# Patient Record
Sex: MALE | Race: WHITE | Employment: FULL TIME | URBAN - METROPOLITAN AREA
[De-identification: names, ages, dates, MRNs, and addresses within clinical notes are randomized per-mention and may not be internally consistent; named-entity substitution may affect disease eponyms.]

---

## 2018-07-25 VITALS
HEART RATE: 78 BPM | HEIGHT: 74 IN | WEIGHT: 217 LBS | RESPIRATION RATE: 12 BRPM | BODY MASS INDEX: 27.85 KG/M2 | TEMPERATURE: 98.1 F | SYSTOLIC BLOOD PRESSURE: 140 MMHG | DIASTOLIC BLOOD PRESSURE: 90 MMHG

## 2018-07-25 RX ORDER — OMEPRAZOLE 20 MG/1
20 CAPSULE, DELAYED RELEASE ORAL DAILY
COMMUNITY

## 2018-07-25 RX ORDER — VALACYCLOVIR HYDROCHLORIDE 500 MG/1
500 TABLET, FILM COATED ORAL 2 TIMES DAILY
COMMUNITY
End: 2019-02-04 | Stop reason: SDUPTHER

## 2018-07-25 RX ORDER — TADALAFIL 5 MG/1
5 TABLET ORAL PRN
COMMUNITY
End: 2019-02-04 | Stop reason: SDUPTHER

## 2019-02-04 ENCOUNTER — OFFICE VISIT (OUTPATIENT)
Dept: PRIMARY CARE CLINIC | Age: 59
End: 2019-02-04
Payer: COMMERCIAL

## 2019-02-04 VITALS
BODY MASS INDEX: 28.62 KG/M2 | SYSTOLIC BLOOD PRESSURE: 160 MMHG | WEIGHT: 223 LBS | HEART RATE: 78 BPM | RESPIRATION RATE: 17 BRPM | HEIGHT: 74 IN | DIASTOLIC BLOOD PRESSURE: 110 MMHG

## 2019-02-04 DIAGNOSIS — A60.00 GENITAL HERPES SIMPLEX, UNSPECIFIED SITE: ICD-10-CM

## 2019-02-04 DIAGNOSIS — Z12.11 ENCOUNTER FOR SCREENING COLONOSCOPY: ICD-10-CM

## 2019-02-04 DIAGNOSIS — N52.9 ERECTILE DYSFUNCTION, UNSPECIFIED ERECTILE DYSFUNCTION TYPE: Primary | ICD-10-CM

## 2019-02-04 PROCEDURE — 99213 OFFICE O/P EST LOW 20 MIN: CPT | Performed by: FAMILY MEDICINE

## 2019-02-04 RX ORDER — TADALAFIL 5 MG/1
5 TABLET ORAL PRN
Qty: 30 TABLET | Refills: 5 | Status: CANCELLED | OUTPATIENT
Start: 2019-02-04

## 2019-02-04 RX ORDER — VALACYCLOVIR HYDROCHLORIDE 500 MG/1
500 TABLET, FILM COATED ORAL DAILY
Qty: 30 TABLET | Refills: 5 | Status: SHIPPED | OUTPATIENT
Start: 2019-02-04 | End: 2019-08-07 | Stop reason: SDUPTHER

## 2019-02-04 RX ORDER — TADALAFIL 5 MG/1
5 TABLET ORAL PRN
Qty: 30 TABLET | Refills: 5 | Status: SHIPPED | OUTPATIENT
Start: 2019-02-04 | End: 2019-08-07 | Stop reason: SDUPTHER

## 2019-02-04 ASSESSMENT — ENCOUNTER SYMPTOMS
ABDOMINAL PAIN: 0
CHEST TIGHTNESS: 0
COLOR CHANGE: 0
NAUSEA: 0
EYE ITCHING: 0
SINUS PRESSURE: 0
COUGH: 0
WHEEZING: 0
EYES NEGATIVE: 1
SHORTNESS OF BREATH: 0
APNEA: 0
RESPIRATORY NEGATIVE: 1
TROUBLE SWALLOWING: 0
EYE PAIN: 0
BACK PAIN: 0
ABDOMINAL DISTENTION: 0
EYE DISCHARGE: 0
CONSTIPATION: 0
VOMITING: 0
SORE THROAT: 0
DIARRHEA: 0
PHOTOPHOBIA: 0
GASTROINTESTINAL NEGATIVE: 1

## 2019-06-19 ENCOUNTER — TELEPHONE (OUTPATIENT)
Dept: PRIMARY CARE CLINIC | Age: 59
End: 2019-06-19

## 2019-06-19 NOTE — TELEPHONE ENCOUNTER
Submitted a PA request for patient's Tadalafil 5mg tablet. PA was denied due to medical necessity. [address] to inquire about patient picking up prescription, the pharmacist stated that patient last picked up the prescription on 6/9/19 and used a discount card to counteract the lack of insurance coverage. Patient was prescribed Tadalafil for erectile dysfunction. Lety may consider approval of Tadalafil for a certain condition (benign prostatic hyperplasia) AND when used in certain situations [after a trial and inadequate response or intolerance to TWO preferred drugs for BPH: either alfuzosin (generic Uroxatral), doxazosin (generic Cardura), dutasteride (generic Avodart), finasteride (generic Proscar), Rapaflo, tamsulosin (generic Flomax), and terazosin; OR, for those with a documented hypersensitivity to any ingredient in the preferred drugs which is not also in Cialis; OR, for those who are concurrently using a drug that cannot be substituted with another drug or temporarily discontinued and is contraindicated or not recommended for concomitant use with the preferred drugs and acceptable for use with tadalafil. [facility]y did not see information that show that the patient has one of these conditions.

## 2019-08-07 DIAGNOSIS — A60.00 GENITAL HERPES SIMPLEX, UNSPECIFIED SITE: ICD-10-CM

## 2019-08-07 DIAGNOSIS — N52.9 ERECTILE DYSFUNCTION, UNSPECIFIED ERECTILE DYSFUNCTION TYPE: ICD-10-CM

## 2019-08-07 RX ORDER — TADALAFIL 5 MG/1
5 TABLET ORAL PRN
Qty: 30 TABLET | Refills: 5 | Status: SHIPPED | OUTPATIENT
Start: 2019-08-07 | End: 2020-02-13 | Stop reason: SDUPTHER

## 2019-08-07 RX ORDER — VALACYCLOVIR HYDROCHLORIDE 500 MG/1
500 TABLET, FILM COATED ORAL DAILY
Qty: 30 TABLET | Refills: 5 | Status: SHIPPED | OUTPATIENT
Start: 2019-08-07 | End: 2020-02-13 | Stop reason: SDUPTHER

## 2019-08-07 NOTE — TELEPHONE ENCOUNTER
Pt about to leave for vacation and has no refills of meds  He is due for an appointment but he said he doesn't know when he can come in  Wants to know if the meds will still be sent  Meds pended  LOV 2/4/19

## 2020-02-13 ENCOUNTER — OFFICE VISIT (OUTPATIENT)
Dept: PRIMARY CARE CLINIC | Age: 60
End: 2020-02-13

## 2020-02-13 VITALS
HEART RATE: 72 BPM | SYSTOLIC BLOOD PRESSURE: 152 MMHG | DIASTOLIC BLOOD PRESSURE: 98 MMHG | OXYGEN SATURATION: 98 % | BODY MASS INDEX: 29.1 KG/M2 | TEMPERATURE: 97.6 F | WEIGHT: 223.6 LBS

## 2020-02-13 PROCEDURE — 99213 OFFICE O/P EST LOW 20 MIN: CPT | Performed by: INTERNAL MEDICINE

## 2020-02-13 RX ORDER — VALACYCLOVIR HYDROCHLORIDE 500 MG/1
500 TABLET, FILM COATED ORAL DAILY
Qty: 30 TABLET | Refills: 11 | Status: SHIPPED | OUTPATIENT
Start: 2020-02-13 | End: 2021-03-08

## 2020-02-13 RX ORDER — TADALAFIL 5 MG/1
5 TABLET ORAL PRN
Qty: 30 TABLET | Refills: 11 | Status: SHIPPED | OUTPATIENT
Start: 2020-02-13 | End: 2020-08-03

## 2020-02-13 ASSESSMENT — ENCOUNTER SYMPTOMS
ABDOMINAL PAIN: 0
COUGH: 0
CHEST TIGHTNESS: 0
SHORTNESS OF BREATH: 0
DIARRHEA: 0
NAUSEA: 0
BACK PAIN: 0
ABDOMINAL DISTENTION: 0

## 2020-08-03 RX ORDER — TADALAFIL 5 MG/1
TABLET ORAL
Qty: 30 TABLET | Refills: 0 | Status: SHIPPED | OUTPATIENT
Start: 2020-08-03 | End: 2021-03-08

## 2021-03-06 DIAGNOSIS — N52.9 ERECTILE DYSFUNCTION, UNSPECIFIED ERECTILE DYSFUNCTION TYPE: ICD-10-CM

## 2021-03-06 DIAGNOSIS — A60.00 GENITAL HERPES SIMPLEX, UNSPECIFIED SITE: ICD-10-CM

## 2021-03-08 RX ORDER — TADALAFIL 5 MG/1
TABLET ORAL
Qty: 30 TABLET | Refills: 0 | Status: SHIPPED | OUTPATIENT
Start: 2021-03-08 | End: 2021-03-17 | Stop reason: SDUPTHER

## 2021-03-08 RX ORDER — VALACYCLOVIR HYDROCHLORIDE 500 MG/1
TABLET, FILM COATED ORAL
Qty: 30 TABLET | Refills: 0 | Status: SHIPPED | OUTPATIENT
Start: 2021-03-08 | End: 2021-03-17 | Stop reason: SDUPTHER

## 2021-03-08 NOTE — TELEPHONE ENCOUNTER
Requested Prescriptions     Pending Prescriptions Disp Refills    tadalafil (CIALIS) 5 MG tablet [Pharmacy Med Name: Tadalafil Oral Tablet 5 MG] 30 tablet 0     Sig: TAKE 1 TABLET BY MOUTH ONE TIME A DAY AS NEEDED FOR ERECTILE DYSFUNCTION    valACYclovir (VALTREX) 500 MG tablet [Pharmacy Med Name: valACYclovir HCl Oral Tablet 500 MG] 30 tablet 0     Sig: TAKE 1 TABLET BY MOUTH ONE TIME A DAY      Last OV 2/13/2020

## 2021-03-17 ENCOUNTER — OFFICE VISIT (OUTPATIENT)
Dept: PRIMARY CARE CLINIC | Age: 61
End: 2021-03-17

## 2021-03-17 VITALS
BODY MASS INDEX: 28.67 KG/M2 | TEMPERATURE: 97.2 F | HEART RATE: 74 BPM | WEIGHT: 223.4 LBS | SYSTOLIC BLOOD PRESSURE: 134 MMHG | DIASTOLIC BLOOD PRESSURE: 86 MMHG | HEIGHT: 74 IN

## 2021-03-17 DIAGNOSIS — N52.9 ERECTILE DYSFUNCTION, UNSPECIFIED ERECTILE DYSFUNCTION TYPE: ICD-10-CM

## 2021-03-17 DIAGNOSIS — I10 ESSENTIAL HYPERTENSION: Primary | ICD-10-CM

## 2021-03-17 DIAGNOSIS — Z13.1 DIABETES MELLITUS SCREENING: ICD-10-CM

## 2021-03-17 DIAGNOSIS — Z00.00 WELL ADULT EXAM: ICD-10-CM

## 2021-03-17 DIAGNOSIS — A60.00 GENITAL HERPES SIMPLEX, UNSPECIFIED SITE: ICD-10-CM

## 2021-03-17 PROCEDURE — 99213 OFFICE O/P EST LOW 20 MIN: CPT | Performed by: INTERNAL MEDICINE

## 2021-03-17 RX ORDER — VALACYCLOVIR HYDROCHLORIDE 500 MG/1
TABLET, FILM COATED ORAL
Qty: 90 TABLET | Refills: 1 | Status: SHIPPED | OUTPATIENT
Start: 2021-03-17 | End: 2021-11-09 | Stop reason: SDUPTHER

## 2021-03-17 RX ORDER — TRIAMTERENE AND HYDROCHLOROTHIAZIDE 37.5; 25 MG/1; MG/1
1 TABLET ORAL DAILY
Qty: 30 TABLET | Refills: 3 | Status: SHIPPED | OUTPATIENT
Start: 2021-03-17 | End: 2022-08-23 | Stop reason: ALTCHOICE

## 2021-03-17 RX ORDER — TADALAFIL 5 MG/1
TABLET ORAL
Qty: 30 TABLET | Refills: 5 | Status: SHIPPED | OUTPATIENT
Start: 2021-03-17 | End: 2021-11-03 | Stop reason: SDUPTHER

## 2021-03-17 SDOH — ECONOMIC STABILITY: INCOME INSECURITY: HOW HARD IS IT FOR YOU TO PAY FOR THE VERY BASICS LIKE FOOD, HOUSING, MEDICAL CARE, AND HEATING?: NOT HARD AT ALL

## 2021-03-17 SDOH — ECONOMIC STABILITY: FOOD INSECURITY: WITHIN THE PAST 12 MONTHS, THE FOOD YOU BOUGHT JUST DIDN'T LAST AND YOU DIDN'T HAVE MONEY TO GET MORE.: NEVER TRUE

## 2021-03-17 ASSESSMENT — ENCOUNTER SYMPTOMS
COUGH: 0
ABDOMINAL DISTENTION: 0
BACK PAIN: 0
SHORTNESS OF BREATH: 0
ABDOMINAL PAIN: 0
DIARRHEA: 0
NAUSEA: 0
CHEST TIGHTNESS: 0

## 2021-03-17 ASSESSMENT — PATIENT HEALTH QUESTIONNAIRE - PHQ9
SUM OF ALL RESPONSES TO PHQ9 QUESTIONS 1 & 2: 0
SUM OF ALL RESPONSES TO PHQ QUESTIONS 1-9: 0

## 2021-03-17 NOTE — PROGRESS NOTES
3/17/2021   Emy Bahena  1960    The patients PMH, surgical history, family history, medications, allergies were all reviewed and updated as appropriate today. Current Outpatient Medications on File Prior to Visit   Medication Sig Dispense Refill    tadalafil (CIALIS) 5 MG tablet TAKE 1 TABLET BY MOUTH ONE TIME A DAY AS NEEDED FOR ERECTILE DYSFUNCTION 30 tablet 0    valACYclovir (VALTREX) 500 MG tablet TAKE 1 TABLET BY MOUTH ONE TIME A DAY  30 tablet 0    aspirin 81 MG tablet Take 81 mg by mouth daily      omeprazole (PRILOSEC) 20 MG delayed release capsule Take 20 mg by mouth daily       No current facility-administered medications on file prior to visit. Chief Complaint   Patient presents with    Erectile Dysfunction    Mouth Lesions       HPI: last seen 2/2020   Wants ED meds refilled today and Valtrex refills  Pt not interested in GoodRx, wants to get his Cialis AMIE with his INS    BP IS ALWAYS HIGH WHEN I COME IN HERE    Review of Systems   Constitutional: Negative for appetite change, chills, fatigue and fever. Respiratory: Negative for cough, chest tightness and shortness of breath. Cardiovascular: Negative for chest pain. Gastrointestinal: Negative for abdominal distention, abdominal pain, diarrhea and nausea. Genitourinary: Negative for difficulty urinating and dysuria. Musculoskeletal: Negative for back pain. Neurological: Negative for dizziness and headaches. Psychiatric/Behavioral: Negative for agitation and behavioral problems. The patient is not nervous/anxious. OBJECTIVE:  BP (!) 146/100 (Site: Left Upper Arm, Position: Sitting, Cuff Size: Large Adult)   Pulse 74   Temp 97.2 °F (36.2 °C)   Ht 6' 1.5\" (1.867 m)   Wt 223 lb 6.4 oz (101.3 kg)   BMI 29.07 kg/m²    Physical Exam  Vitals signs and nursing note reviewed. Constitutional:       General: He is not in acute distress. Appearance: He is well-developed.       Comments: BP (!) 142/90   Pulse 74   Temp 97.2 °F (36.2 °C)   Ht 6' 1.5\" (1.867 m)   Wt 223 lb 6.4 oz (101.3 kg)   BMI 29.07 kg/m²    HENT:      Head: Normocephalic and atraumatic. Eyes:      General: No scleral icterus. Right eye: No discharge. Left eye: No discharge. Conjunctiva/sclera: Conjunctivae normal.      Pupils: Pupils are equal, round, and reactive to light. Neck:      Musculoskeletal: Normal range of motion and neck supple. Thyroid: No thyromegaly. Vascular: No JVD. Trachea: No tracheal deviation. Cardiovascular:      Rate and Rhythm: Normal rate and regular rhythm. Heart sounds: Normal heart sounds. No murmur. Pulmonary:      Effort: Pulmonary effort is normal. No respiratory distress. Breath sounds: Normal breath sounds. No wheezing or rales. Abdominal:      General: Bowel sounds are normal. There is no distension. Palpations: Abdomen is soft. Tenderness: There is no abdominal tenderness. There is no guarding or rebound. Musculoskeletal: Normal range of motion. General: No tenderness. Lymphadenopathy:      Cervical: No cervical adenopathy. Skin:     General: Skin is warm and dry. Findings: No erythema or rash. Neurological:      Mental Status: He is alert and oriented to person, place, and time. Cranial Nerves: No cranial nerve deficit. Coordination: Coordination normal.      Deep Tendon Reflexes: Reflexes normal.   Psychiatric:         Behavior: Behavior normal.         Thought Content: Thought content normal.         Data Review:   CBC: No results found for: WBC, HGB, HCT, MCV, PLT  Chemistry: No results found for: NA, K, CL, CO2, PHOS, BUN, CREATININE  Hepatic Function: No results found for: AST, ALT, ALB, BILIDIR, BILITOT, ALKPHOS  No results found for: LIPASE, AMYLASE  Lipids: No results found for: CHOL, HDL, TRIG    ASSESSMENT/PLAN  1.  Genital herpes simplex, unspecified site  Valtrex daily prophylaxis #90 with 1 refills OK'd 2.) Takes Cialis 5mg #30 almost daily #30 with 5 refills for ED    3.) HTN- add Maxzide 37.5/25  F/u in 3 months for BP cuff correlation, pt believes he only has situational HTN    Fasting labs ordered in interim    Susanna Santos    Electronically signed by Susanna Santos MD on 3/17/2021 at 2:29 PM

## 2021-10-28 ENCOUNTER — TELEPHONE (OUTPATIENT)
Dept: PRIMARY CARE CLINIC | Age: 61
End: 2021-10-28

## 2021-10-28 NOTE — TELEPHONE ENCOUNTER
[address] faxed over refill requests for the following medications     tadalafil (CIALIS) 5 MG tablet 30 tablet 5 3/17/2021     Sig: TAKE 1 TABLET BY MOUTH ONE TIME A DAY AS NEEDED FOR ERECTILE DYSFUNCTION    Sent to pharmacy as:  Tadalafil 5 MG Oral Tablet (CIALIS)      valACYclovir (VALTREX) 500 MG tablet 90 tablet 1 3/17/2021     Sig: TAKE 1 TABLET BY MOUTH ONE TIME A DAY    Sent to pharmacy as: valACYclovir HCl 500 MG Oral Tablet (VALTREX)      Last OV: 3/17/21  Next OV: not scheduled     (Tried calling patient to schedule an appointment for med refills, left VM to call the office back)

## 2021-11-03 DIAGNOSIS — N52.9 ERECTILE DYSFUNCTION, UNSPECIFIED ERECTILE DYSFUNCTION TYPE: ICD-10-CM

## 2021-11-03 RX ORDER — TADALAFIL 5 MG/1
TABLET ORAL
Qty: 30 TABLET | Refills: 5 | Status: SHIPPED | OUTPATIENT
Start: 2021-11-03 | End: 2022-05-05 | Stop reason: SDUPTHER

## 2021-11-03 NOTE — TELEPHONE ENCOUNTER
----- Message from Katya Medina sent at 11/2/2021  5:10 PM EDT -----  Subject: Refill Request    QUESTIONS  Name of Medication? tadalafil (CIALIS) 5 MG tablet  Patient-reported dosage and instructions? 1 TABLET BY MOUTH ONE TIME A DAY   AS NEEDED FOR ERECTILE DYSFUNCTION  How many days do you have left? 7  Preferred Pharmacy? [address]  Pharmacy phone number (if available)? [phone]  ---------------------------------------------------------------------------  --------------,  Name of Medication? valACYclovir (VALTREX) 500 MG tablet  Patient-reported dosage and instructions? TAKE 1 TABLET BY MOUTH ONE TIME   A DAY  How many days do you have left? 7  Preferred Pharmacy? [address]  Pharmacy phone number (if available)? [phone]  ---------------------------------------------------------------------------  --------------  Ky Fraction INFO  What is the best way for the office to contact you? OK to leave message on   voicemail  Preferred Call Back Phone Number?  [phone]

## 2021-11-09 ENCOUNTER — TELEPHONE (OUTPATIENT)
Dept: PRIMARY CARE CLINIC | Age: 61
End: 2021-11-09

## 2021-11-09 DIAGNOSIS — A60.00 GENITAL HERPES SIMPLEX, UNSPECIFIED SITE: ICD-10-CM

## 2021-11-09 RX ORDER — VALACYCLOVIR HYDROCHLORIDE 500 MG/1
TABLET, FILM COATED ORAL
Qty: 90 TABLET | Refills: 1 | Status: SHIPPED | OUTPATIENT
Start: 2021-11-09 | End: 2022-05-05

## 2021-11-09 NOTE — TELEPHONE ENCOUNTER
Patients pharmacy: [facility], [address]   [address], [address] Requested a prescription refill for the following medication:   valACYclovir (VALTREX) 500 MG tablet 90 tablet 1 3/17/2021     Sig: TAKE 1 TABLET BY MOUTH ONE TIME A DAY    Sent to pharmacy as: valACYclovir HCl 500 MG Oral Tablet (VALTREX)

## 2022-05-05 DIAGNOSIS — A60.00 GENITAL HERPES SIMPLEX, UNSPECIFIED SITE: ICD-10-CM

## 2022-05-05 DIAGNOSIS — N52.9 ERECTILE DYSFUNCTION, UNSPECIFIED ERECTILE DYSFUNCTION TYPE: ICD-10-CM

## 2022-05-05 RX ORDER — VALACYCLOVIR HYDROCHLORIDE 500 MG/1
TABLET, FILM COATED ORAL
Qty: 30 TABLET | Refills: 0 | Status: SHIPPED | OUTPATIENT
Start: 2022-05-05 | End: 2022-07-08 | Stop reason: SDUPTHER

## 2022-05-06 RX ORDER — TADALAFIL 5 MG/1
TABLET ORAL
Qty: 30 TABLET | Refills: 0 | Status: SHIPPED | OUTPATIENT
Start: 2022-05-06 | End: 2022-07-08 | Stop reason: SDUPTHER

## 2022-07-08 DIAGNOSIS — N52.9 ERECTILE DYSFUNCTION, UNSPECIFIED ERECTILE DYSFUNCTION TYPE: ICD-10-CM

## 2022-07-08 DIAGNOSIS — A60.00 GENITAL HERPES SIMPLEX, UNSPECIFIED SITE: ICD-10-CM

## 2022-07-08 RX ORDER — VALACYCLOVIR HYDROCHLORIDE 500 MG/1
TABLET, FILM COATED ORAL
Qty: 30 TABLET | Refills: 0 | Status: SHIPPED | OUTPATIENT
Start: 2022-07-08 | End: 2022-08-23 | Stop reason: SDUPTHER

## 2022-07-08 RX ORDER — TADALAFIL 5 MG/1
TABLET ORAL
Qty: 15 TABLET | Refills: 0 | Status: SHIPPED | OUTPATIENT
Start: 2022-07-08

## 2022-07-08 NOTE — TELEPHONE ENCOUNTER
----- Message from Rosie Bella sent at 7/8/2022 11:48 AM EDT -----  Subject: Refill Request    QUESTIONS  Name of Medication? valACYclovir (VALTREX) 500 MG tablet  Patient-reported dosage and instructions? 1 daily   How many days do you have left? 0  Preferred Pharmacy? [facility] [address]  Pharmacy phone number (if available)? [phone]  ---------------------------------------------------------------------------  --------------,  Name of Medication? tadalafil (CIALIS) 5 MG tablet  Patient-reported dosage and instructions? 5mg, 1 daily  How many days do you have left? 0  Preferred Pharmacy? [facility] [address]  Pharmacy phone number (if available)? [phone]  ---------------------------------------------------------------------------  --------------  Razia LEPE  What is the best way for the office to contact you? OK to leave message on   voicemail  Preferred Call Back Phone Number? [phone]  ---------------------------------------------------------------------------  --------------  SCRIPT ANSWERS  Relationship to Patient?  Self

## 2022-08-18 ENCOUNTER — TELEPHONE (OUTPATIENT)
Dept: PRIMARY CARE CLINIC | Age: 62
End: 2022-08-18

## 2022-08-18 DIAGNOSIS — A60.00 GENITAL HERPES SIMPLEX, UNSPECIFIED SITE: ICD-10-CM

## 2022-08-18 DIAGNOSIS — N52.9 ERECTILE DYSFUNCTION, UNSPECIFIED ERECTILE DYSFUNCTION TYPE: ICD-10-CM

## 2022-08-18 RX ORDER — VALACYCLOVIR HYDROCHLORIDE 500 MG/1
TABLET, FILM COATED ORAL
Qty: 30 TABLET | Refills: 0 | OUTPATIENT
Start: 2022-08-18

## 2022-08-18 RX ORDER — TADALAFIL 5 MG/1
TABLET ORAL
Qty: 15 TABLET | Refills: 0 | OUTPATIENT
Start: 2022-08-18

## 2022-08-18 NOTE — TELEPHONE ENCOUNTER
Recent Visits  Date Type Provider Dept   03/17/21 Office Visit Pj Kiran MD [facility] [address] recent visits within past 540 days with a meds authorizing provider and meeting all other requirements  Future Appointments  No visits were found meeting these conditions. Showing future appointments within next 150 days with a meds authorizing provider and meeting all other requirements     No refills until the pt completes an appt.  LVM asking for the pt to return call to schedule with Dr Martina Moscoso only Thalidomide Pregnancy And Lactation Text: This medication is Pregnancy Category X and is absolutely contraindicated during pregnancy. It is unknown if it is excreted in breast milk.

## 2022-08-18 NOTE — TELEPHONE ENCOUNTER
----- Message from Brad Santos sent at 8/17/2022  2:31 PM EDT -----  Subject: Refill Request    QUESTIONS  Name of Medication? valACYclovir (VALTREX) 500 MG tablet  Patient-reported dosage and instructions? valACYclovir (VALTREX) 500 MG   tablet 1 a day   How many days do you have left? 0  Preferred Pharmacy? [address]  Pharmacy phone number (if available)? [phone]  ---------------------------------------------------------------------------  --------------,  Name of Medication? tadalafil (CIALIS) 5 MG tablet  Patient-reported dosage and instructions? tadalafil (CIALIS) 5 MG tablet 1   a day   How many days do you have left? 0  Preferred Pharmacy? [address]  Pharmacy phone number (if available)? [phone]  ---------------------------------------------------------------------------  --------------  Haylie [facility]LEY  What is the best way for the office to contact you? OK to leave message on   voicemail  Preferred Call Back Phone Number? [phone]  ---------------------------------------------------------------------------  --------------  SCRIPT ANSWERS  Relationship to Patient?  Self

## 2022-08-23 ENCOUNTER — NURSE TRIAGE (OUTPATIENT)
Dept: OTHER | Facility: CLINIC | Age: 62
End: 2022-08-23

## 2022-08-23 ENCOUNTER — OFFICE VISIT (OUTPATIENT)
Dept: PRIMARY CARE CLINIC | Age: 62
End: 2022-08-23

## 2022-08-23 VITALS
BODY MASS INDEX: 29 KG/M2 | HEIGHT: 74 IN | SYSTOLIC BLOOD PRESSURE: 150 MMHG | HEART RATE: 61 BPM | DIASTOLIC BLOOD PRESSURE: 100 MMHG | TEMPERATURE: 97.2 F | OXYGEN SATURATION: 98 % | WEIGHT: 226 LBS

## 2022-08-23 DIAGNOSIS — A60.00 GENITAL HERPES SIMPLEX, UNSPECIFIED SITE: ICD-10-CM

## 2022-08-23 DIAGNOSIS — I10 PRIMARY HYPERTENSION: ICD-10-CM

## 2022-08-23 DIAGNOSIS — Z12.5 PROSTATE CANCER SCREENING: ICD-10-CM

## 2022-08-23 DIAGNOSIS — Z12.11 COLON CANCER SCREENING: ICD-10-CM

## 2022-08-23 DIAGNOSIS — I10 ESSENTIAL HYPERTENSION: ICD-10-CM

## 2022-08-23 DIAGNOSIS — Z23 NEED FOR TDAP VACCINATION: ICD-10-CM

## 2022-08-23 DIAGNOSIS — Z11.59 SCREENING FOR VIRAL DISEASE: Primary | ICD-10-CM

## 2022-08-23 PROCEDURE — 99213 OFFICE O/P EST LOW 20 MIN: CPT | Performed by: INTERNAL MEDICINE

## 2022-08-23 RX ORDER — VALACYCLOVIR HYDROCHLORIDE 500 MG/1
TABLET, FILM COATED ORAL
Qty: 30 TABLET | Refills: 5 | Status: SHIPPED | OUTPATIENT
Start: 2022-08-23

## 2022-08-23 RX ORDER — TRIAMTERENE AND HYDROCHLOROTHIAZIDE 37.5; 25 MG/1; MG/1
1 TABLET ORAL DAILY
Qty: 90 TABLET | Refills: 1 | Status: CANCELLED | OUTPATIENT
Start: 2022-08-23

## 2022-08-23 RX ORDER — LISINOPRIL AND HYDROCHLOROTHIAZIDE 25; 20 MG/1; MG/1
1 TABLET ORAL DAILY
Qty: 30 TABLET | Refills: 5 | Status: SHIPPED | OUTPATIENT
Start: 2022-08-23

## 2022-08-23 ASSESSMENT — PATIENT HEALTH QUESTIONNAIRE - PHQ9
SUM OF ALL RESPONSES TO PHQ QUESTIONS 1-9: 0
SUM OF ALL RESPONSES TO PHQ QUESTIONS 1-9: 0
SUM OF ALL RESPONSES TO PHQ9 QUESTIONS 1 & 2: 0
SUM OF ALL RESPONSES TO PHQ QUESTIONS 1-9: 0
2. FEELING DOWN, DEPRESSED OR HOPELESS: 0
SUM OF ALL RESPONSES TO PHQ QUESTIONS 1-9: 0
1. LITTLE INTEREST OR PLEASURE IN DOING THINGS: 0

## 2022-08-23 NOTE — PROGRESS NOTES
8/23/2022   [facility]  1960    The patients PMH, surgical history, family history, medications, allergies were all reviewed and updated as appropriate today. Current Outpatient Medications on File Prior to Visit   Medication Sig Dispense Refill    tadalafil (CIALIS) 5 MG tablet TAKE 1 TABLET BY MOUTH ONE TIME A DAY AS NEEDED FOR ERECTILE DYSFUNCTION 15 tablet 0    valACYclovir (VALTREX) 500 MG tablet TAKE 1 TABLET BY MOUTH EVERY DAY 30 tablet 0    triamterene-hydroCHLOROthiazide (MAXZIDE-25) 37.5-25 MG per tablet Take 1 tablet by mouth daily 30 tablet 3    aspirin 81 MG tablet Take 81 mg by mouth daily      omeprazole (PRILOSEC) 20 MG delayed release capsule Take 20 mg by mouth daily       No current facility-administered medications on file prior to visit. Chief Complaint   Patient presents with    Follow-up    Medication Refill         HPI:  Emergency visit since he stopped taking his BP meds 14 months ago then noticed that his BP was elevated today, so triage nurse made him an emergency visit today to get diuretic refilled  Due for labs again as well    Pt willing to get TDAP today while here   Eyes got red and it affected my vision and I just felt ill REAL BAD! THIS AM I TOOK MY BP AT HOME THIS AM    Review of Systems-due for colonoscopy for colon CA screen  COVID vax is again advised  Depression screen due    OBJECTIVE:  Ht 6' 1.5\" (1.867 m)   Wt 226 lb (102.5 kg)   BMI 29.41 kg/m²   BP (!) 180/100 (Site: Left Upper Arm, Position: Sitting, Cuff Size: Medium Adult)   Pulse 61   Temp 97.2 °F (36.2 °C) (Infrared)   Ht 6' 1.5\" (1.867 m)   Wt 226 lb (102.5 kg)   SpO2 98%   BMI 29.41 kg/m²     Physical Exam  Constitutional:       General: He is not in acute distress. Appearance: He is well-developed. He is not diaphoretic. HENT:      Head: Normocephalic and atraumatic.       Right Ear: External ear normal.      Left Ear: External ear normal.   Eyes:      General: No scleral icterus. Conjunctiva/sclera: Conjunctivae normal.      Pupils: Pupils are equal, round, and reactive to light. Neck:      Thyroid: No thyromegaly. Vascular: No JVD. Cardiovascular:      Rate and Rhythm: Normal rate and regular rhythm. Heart sounds: Normal heart sounds. No murmur heard. Pulmonary:      Effort: Pulmonary effort is normal. No respiratory distress. Breath sounds: Normal breath sounds. No wheezing or rales. Abdominal:      General: Bowel sounds are normal. There is no distension. Palpations: Abdomen is soft. There is no mass. Tenderness: There is no abdominal tenderness. There is no guarding or rebound. Genitourinary:     Comments: deferred  Musculoskeletal:      Cervical back: Normal range of motion and neck supple. Lymphadenopathy:      Cervical: No cervical adenopathy. Data Review:   CBC: No results found for: WBC, HGB, HCT, MCV, PLT  Chemistry: No results found for: NA, K, CL, CO2, PHOS, BUN, CREATININE, CA  Hepatic Function: No results found for: AST, ALT, ALB, BILIDIR, BILITOT, ALKPHOS  No results found for: LIPASE, AMYLASE  Lipids: No results found for: CHOL, HDL, TRIG    ASSESSMENT/PLAN  1. Primary hypertension  Restart Prinzide instead of Maxzide due to pt side effects on maxzide  BP check in 2 weeks after meds restarted  f/u in 1 month to check BP on meds  Labs ordered today    2. Screening for viral disease  Viral screen ordered per care gap    3.  Prostate cancer screening  PSA ordered with labs    4.) TDAP deferred today due to elevated BP readings    5.) colon CA screen- GI referral [facility] for colon CA screening but he states his INS wont cover it     Wants Valtrex 500mg QD refilled today as chronic herpes prophylaxis    COVID vax is advised  Consider Shingrix vaccination series of 2 shots over 2-6 months if desired for protection from shingles-check with INS first re: coverage before beginning series    Angelo Moran MD           Electronically signed by Pj Kiran MD on 8/23/2022 at 12:17 PM

## 2022-08-23 NOTE — TELEPHONE ENCOUNTER
Received call from Anjel at [facility]- JAQUELINE with [employer] Complaint. Subjective: Caller states \"My blood pressure was very high this morning. \"     Current Symptoms: High BP today. 07:10 am- 182/113      Onset: 1 day ago; intermittent    Associated Symptoms:  Patient reports high stress at work and with family    Pain Severity: 0/10    Temperature: Denies    What has been tried: N/A    Recommended disposition: See in Office Today    Care advice provided, patient verbalizes understanding; denies any other questions or concerns; instructed to call back for any new or worsening symptoms. Patient has appointment at 12:30 pm.  Recommendation after triage was for patient to be seen today. Patient denies any current symptoms. No headache, blurred vision, chest pain, SOB, or dizziness. Will call back if new symptoms occur before upcoming appointment. Attention Provider: Thank you for allowing me to participate in the care of your patient. The patient was connected to triage in response to information provided to the ECC/PSC. Please do not respond through this encounter as the response is not directed to a shared pool.     Reason for Disposition   Systolic BP >= 030 OR Diastolic >= 326    Protocols used: Blood Pressure - High-ADULT-[place]

## 2022-09-08 ENCOUNTER — NURSE ONLY (OUTPATIENT)
Dept: PRIMARY CARE CLINIC | Age: 62
End: 2022-09-08

## 2022-09-08 VITALS — DIASTOLIC BLOOD PRESSURE: 80 MMHG | SYSTOLIC BLOOD PRESSURE: 140 MMHG

## 2022-09-08 DIAGNOSIS — I10 ESSENTIAL HYPERTENSION: Primary | ICD-10-CM

## 2022-09-08 PROCEDURE — 99999 PR OFFICE/OUTPT VISIT,PROCEDURE ONLY: CPT | Performed by: INTERNAL MEDICINE

## 2022-09-08 NOTE — PROGRESS NOTES
Patient here for blood pressure check. Patient taking lisinopril daily.  Informed patient to continue medication

## 2022-09-22 PROBLEM — Z12.11 COLON CANCER SCREENING: Status: RESOLVED | Noted: 2022-08-23 | Resolved: 2022-09-22

## 2023-02-16 DIAGNOSIS — A60.00 GENITAL HERPES SIMPLEX, UNSPECIFIED SITE: ICD-10-CM

## 2023-02-16 RX ORDER — LISINOPRIL AND HYDROCHLOROTHIAZIDE 25; 20 MG/1; MG/1
1 TABLET ORAL DAILY
Qty: 30 TABLET | Refills: 5 | Status: SHIPPED | OUTPATIENT
Start: 2023-02-16

## 2023-02-16 RX ORDER — VALACYCLOVIR HYDROCHLORIDE 500 MG/1
TABLET, FILM COATED ORAL
Qty: 30 TABLET | Refills: 5 | Status: SHIPPED | OUTPATIENT
Start: 2023-02-16

## 2023-02-16 NOTE — TELEPHONE ENCOUNTER
Recent Visits  Date Type Provider Dept   08/23/22 Office Visit Reji Rincon MD [facility] [address] recent visits within past 540 days with a meds authorizing provider and meeting all other requirements  Future Appointments  No visits were found meeting these conditions.   Showing future appointments within next 150 days with a meds authorizing provider and meeting all other requirements

## 2023-02-16 NOTE — TELEPHONE ENCOUNTER
Recent Visits  Date Type Provider Dept   08/23/22 Office Visit Gilda Unger MD [facility] [address] recent visits within past 540 days with a meds authorizing provider and meeting all other requirements  Future Appointments  No visits were found meeting these conditions.   Showing future appointments within next 150 days with a meds authorizing provider and meeting all other requirements

## 2023-08-25 ENCOUNTER — TELEPHONE (OUTPATIENT)
Dept: PRIMARY CARE CLINIC | Age: 63
End: 2023-08-25

## 2023-08-25 DIAGNOSIS — I10 PRIMARY HYPERTENSION: Primary | ICD-10-CM

## 2023-08-25 DIAGNOSIS — A60.00 GENITAL HERPES SIMPLEX, UNSPECIFIED SITE: ICD-10-CM

## 2023-08-25 NOTE — TELEPHONE ENCOUNTER
Recent Visits  Date Type Provider Dept   08/23/22 Office Visit Syed Verdin MD [facility] recent visits within past 540 days with a meds authorizing provider and meeting all other requirements  Future Appointments  No visits were found meeting these conditions.   Showing future appointments within next 150 days with a meds authorizing provider and meeting all other requirements

## 2023-08-25 NOTE — TELEPHONE ENCOUNTER
Dk Christiansen, call for prescription refill for valACYclovir (VALTREX) 500 MG tablet   lisinopril-hydroCHLOROthiazide (PRINZIDE;ZESTORETIC) 20-25 MG per tablet     [facility] [address] [place], [place] - [address] [facility] -  [phone] - F [phone]   [address] [place] [address] [place]  [address]

## 2023-08-28 RX ORDER — VALACYCLOVIR HYDROCHLORIDE 500 MG/1
TABLET, FILM COATED ORAL
Qty: 30 TABLET | Refills: 0 | Status: SHIPPED | OUTPATIENT
Start: 2023-08-28

## 2023-08-28 RX ORDER — LISINOPRIL AND HYDROCHLOROTHIAZIDE 25; 20 MG/1; MG/1
1 TABLET ORAL DAILY
Qty: 30 TABLET | Refills: 0 | Status: SHIPPED | OUTPATIENT
Start: 2023-08-28

## 2023-09-22 DIAGNOSIS — I10 PRIMARY HYPERTENSION: ICD-10-CM

## 2023-09-22 RX ORDER — LISINOPRIL AND HYDROCHLOROTHIAZIDE 25; 20 MG/1; MG/1
1 TABLET ORAL DAILY
Qty: 30 TABLET | Refills: 0 | OUTPATIENT
Start: 2023-09-22

## 2023-09-22 NOTE — TELEPHONE ENCOUNTER
Recent Visits  Date Type Provider Dept   08/23/22 Office Visit Antoine White MD [facility] recent visits within past 540 days with a meds authorizing provider and meeting all other requirements  Future Appointments  No visits were found meeting these conditions. Showing future appointments within next 150 days with a meds authorizing provider and meeting all other requirements       Pt will call back to set up an appt.

## 2023-10-15 SDOH — ECONOMIC STABILITY: FOOD INSECURITY: WITHIN THE PAST 12 MONTHS, THE FOOD YOU BOUGHT JUST DIDN'T LAST AND YOU DIDN'T HAVE MONEY TO GET MORE.: NEVER TRUE

## 2023-10-15 SDOH — ECONOMIC STABILITY: INCOME INSECURITY: HOW HARD IS IT FOR YOU TO PAY FOR THE VERY BASICS LIKE FOOD, HOUSING, MEDICAL CARE, AND HEATING?: NOT HARD AT ALL

## 2023-10-15 SDOH — ECONOMIC STABILITY: HOUSING INSECURITY
IN THE LAST 12 MONTHS, WAS THERE A TIME WHEN YOU DID NOT HAVE A STEADY PLACE TO SLEEP OR SLEPT IN A SHELTER (INCLUDING NOW)?: NO

## 2023-10-15 SDOH — ECONOMIC STABILITY: FOOD INSECURITY: WITHIN THE PAST 12 MONTHS, YOU WORRIED THAT YOUR FOOD WOULD RUN OUT BEFORE YOU GOT MONEY TO BUY MORE.: NEVER TRUE

## 2023-10-15 SDOH — ECONOMIC STABILITY: TRANSPORTATION INSECURITY
IN THE PAST 12 MONTHS, HAS LACK OF TRANSPORTATION KEPT YOU FROM MEETINGS, WORK, OR FROM GETTING THINGS NEEDED FOR DAILY LIVING?: NO

## 2023-10-15 ASSESSMENT — PATIENT HEALTH QUESTIONNAIRE - PHQ9
1. LITTLE INTEREST OR PLEASURE IN DOING THINGS: NOT AT ALL
SUM OF ALL RESPONSES TO PHQ QUESTIONS 1-9: 0
2. FEELING DOWN, DEPRESSED OR HOPELESS: 0
SUM OF ALL RESPONSES TO PHQ QUESTIONS 1-9: 0
SUM OF ALL RESPONSES TO PHQ9 QUESTIONS 1 & 2: 0
2. FEELING DOWN, DEPRESSED OR HOPELESS: NOT AT ALL
1. LITTLE INTEREST OR PLEASURE IN DOING THINGS: 0
SUM OF ALL RESPONSES TO PHQ9 QUESTIONS 1 & 2: 0
SUM OF ALL RESPONSES TO PHQ QUESTIONS 1-9: 0
SUM OF ALL RESPONSES TO PHQ QUESTIONS 1-9: 0

## 2023-10-16 ENCOUNTER — OFFICE VISIT (OUTPATIENT)
Dept: PRIMARY CARE CLINIC | Age: 63
End: 2023-10-16

## 2023-10-16 VITALS
WEIGHT: 223 LBS | RESPIRATION RATE: 17 BRPM | HEIGHT: 74 IN | HEART RATE: 76 BPM | SYSTOLIC BLOOD PRESSURE: 126 MMHG | BODY MASS INDEX: 28.62 KG/M2 | DIASTOLIC BLOOD PRESSURE: 82 MMHG

## 2023-10-16 DIAGNOSIS — Z11.59 SCREENING FOR VIRAL DISEASE: Primary | ICD-10-CM

## 2023-10-16 DIAGNOSIS — Z23 NEED FOR VACCINATION FOR H FLU TYPE B: ICD-10-CM

## 2023-10-16 DIAGNOSIS — A60.00 GENITAL HERPES SIMPLEX, UNSPECIFIED SITE: ICD-10-CM

## 2023-10-16 DIAGNOSIS — Z12.11 COLON CANCER SCREENING: ICD-10-CM

## 2023-10-16 DIAGNOSIS — N52.9 ERECTILE DYSFUNCTION, UNSPECIFIED ERECTILE DYSFUNCTION TYPE: ICD-10-CM

## 2023-10-16 DIAGNOSIS — M72.0 DUPUYTREN CONTRACTURE: ICD-10-CM

## 2023-10-16 DIAGNOSIS — I10 PRIMARY HYPERTENSION: ICD-10-CM

## 2023-10-16 PROCEDURE — 3074F SYST BP LT 130 MM HG: CPT | Performed by: INTERNAL MEDICINE

## 2023-10-16 PROCEDURE — 3079F DIAST BP 80-89 MM HG: CPT | Performed by: INTERNAL MEDICINE

## 2023-10-16 PROCEDURE — 99213 OFFICE O/P EST LOW 20 MIN: CPT | Performed by: INTERNAL MEDICINE

## 2023-10-16 RX ORDER — VALACYCLOVIR HYDROCHLORIDE 500 MG/1
TABLET, FILM COATED ORAL
Qty: 30 TABLET | Refills: 5 | Status: SHIPPED | OUTPATIENT
Start: 2023-10-16

## 2023-10-16 RX ORDER — LISINOPRIL AND HYDROCHLOROTHIAZIDE 25; 20 MG/1; MG/1
1 TABLET ORAL DAILY
Qty: 30 TABLET | Refills: 5 | Status: SHIPPED | OUTPATIENT
Start: 2023-10-16

## 2023-10-16 RX ORDER — TADALAFIL 5 MG/1
TABLET ORAL
Qty: 30 TABLET | Refills: 5 | Status: SHIPPED | OUTPATIENT
Start: 2023-10-16

## 2023-10-16 NOTE — PATIENT INSTRUCTIONS
Schedule screening colonoscopy with gastroenterology for colon cancer screening    Consider Tdap booster in the future    COVID vaccination is advised to pharmacy

## 2023-11-27 ENCOUNTER — TELEPHONE (OUTPATIENT)
Dept: PRIMARY CARE CLINIC | Age: 63
End: 2023-11-27

## 2023-11-27 DIAGNOSIS — R05.1 ACUTE COUGH: Primary | ICD-10-CM

## 2023-11-27 RX ORDER — AZITHROMYCIN 250 MG/1
250 TABLET, FILM COATED ORAL SEE ADMIN INSTRUCTIONS
Qty: 6 TABLET | Refills: 0 | Status: SHIPPED | OUTPATIENT
Start: 2023-11-27 | End: 2023-12-02

## 2023-11-27 NOTE — TELEPHONE ENCOUNTER
Patient tested negative for COVID on Friday 11/24. Patient has had congestion, headache and cough for about 4 days. He is unable to schedule an apt today but wanted to see what Dr. Hermon Callander recommends. Please advise.

## 2023-11-27 NOTE — TELEPHONE ENCOUNTER
Z-pack sent to [facility] in for f/u visit if symptoms persist after the antibiotic    Danitza Hurtado MD

## 2024-01-23 ENCOUNTER — COMMUNITY OUTREACH (OUTPATIENT)
Dept: PRIMARY CARE CLINIC | Age: 64
End: 2024-01-23

## 2024-01-23 NOTE — PROGRESS NOTES
Patient's HM shows they are overdue for Colonoscopy   CareEverywhere and  files searched  without success.

## 2024-04-17 DIAGNOSIS — A60.00 GENITAL HERPES SIMPLEX, UNSPECIFIED SITE: ICD-10-CM

## 2024-04-17 DIAGNOSIS — I10 PRIMARY HYPERTENSION: ICD-10-CM

## 2024-04-17 DIAGNOSIS — N52.9 ERECTILE DYSFUNCTION, UNSPECIFIED ERECTILE DYSFUNCTION TYPE: ICD-10-CM

## 2024-04-17 RX ORDER — TADALAFIL 5 MG/1
TABLET ORAL
Qty: 30 TABLET | Refills: 5 | Status: SHIPPED | OUTPATIENT
Start: 2024-04-17

## 2024-04-17 RX ORDER — LISINOPRIL AND HYDROCHLOROTHIAZIDE 25; 20 MG/1; MG/1
1 TABLET ORAL DAILY
Qty: 30 TABLET | Refills: 5 | Status: SHIPPED | OUTPATIENT
Start: 2024-04-17

## 2024-04-17 RX ORDER — VALACYCLOVIR HYDROCHLORIDE 500 MG/1
TABLET, FILM COATED ORAL
Qty: 30 TABLET | Refills: 5 | Status: SHIPPED | OUTPATIENT
Start: 2024-04-17

## 2024-10-16 ENCOUNTER — TELEPHONE (OUTPATIENT)
Dept: PRIMARY CARE CLINIC | Age: 64
End: 2024-10-16

## 2024-10-16 DIAGNOSIS — I10 PRIMARY HYPERTENSION: ICD-10-CM

## 2024-10-16 DIAGNOSIS — N52.9 ERECTILE DYSFUNCTION, UNSPECIFIED ERECTILE DYSFUNCTION TYPE: ICD-10-CM

## 2024-10-16 DIAGNOSIS — A60.00 GENITAL HERPES SIMPLEX, UNSPECIFIED SITE: ICD-10-CM

## 2024-10-16 RX ORDER — LISINOPRIL AND HYDROCHLOROTHIAZIDE 20; 25 MG/1; MG/1
1 TABLET ORAL DAILY
Qty: 90 TABLET | Refills: 0 | Status: SHIPPED | OUTPATIENT
Start: 2024-10-16

## 2024-10-16 RX ORDER — VALACYCLOVIR HYDROCHLORIDE 500 MG/1
TABLET, FILM COATED ORAL
Qty: 90 TABLET | Refills: 0 | Status: SHIPPED | OUTPATIENT
Start: 2024-10-16

## 2024-10-16 RX ORDER — TADALAFIL 5 MG/1
TABLET ORAL
Qty: 30 TABLET | Refills: 1 | Status: SHIPPED | OUTPATIENT
Start: 2024-10-16

## 2024-10-16 NOTE — TELEPHONE ENCOUNTER
Pt requesting refill on lisinopril-hydroCHLOROthiazide (PRINZIDE;ZESTORETIC) 20-25 MG per tablet, tadalafil (CIALIS) 5 MG tablet and valACYclovir (VALTREX) 500 MG tablet. Pt out of medication and prefers [facility]. Pt stated due to work schedule he is unable to make it in for any follow up appts. Can be reached at [phone].

## 2024-10-16 NOTE — TELEPHONE ENCOUNTER
Recent Visits  Date Type Provider Dept   10/16/23 Office Visit Enmanuel Terry MD [facility]   Showing recent visits within past 540 days with a meds authorizing provider and meeting all other requirements  Future Appointments  No visits were found meeting these conditions.  Showing future appointments within next 150 days with a meds authorizing provider and meeting all other requirements     10/16/2023

## 2025-01-25 SDOH — ECONOMIC STABILITY: TRANSPORTATION INSECURITY
IN THE PAST 12 MONTHS, HAS THE LACK OF TRANSPORTATION KEPT YOU FROM MEDICAL APPOINTMENTS OR FROM GETTING MEDICATIONS?: NO

## 2025-01-25 SDOH — ECONOMIC STABILITY: INCOME INSECURITY: IN THE LAST 12 MONTHS, WAS THERE A TIME WHEN YOU WERE NOT ABLE TO PAY THE MORTGAGE OR RENT ON TIME?: NO

## 2025-01-25 SDOH — ECONOMIC STABILITY: FOOD INSECURITY: WITHIN THE PAST 12 MONTHS, THE FOOD YOU BOUGHT JUST DIDN'T LAST AND YOU DIDN'T HAVE MONEY TO GET MORE.: NEVER TRUE

## 2025-01-25 SDOH — ECONOMIC STABILITY: FOOD INSECURITY: WITHIN THE PAST 12 MONTHS, YOU WORRIED THAT YOUR FOOD WOULD RUN OUT BEFORE YOU GOT MONEY TO BUY MORE.: NEVER TRUE

## 2025-01-25 ASSESSMENT — PATIENT HEALTH QUESTIONNAIRE - PHQ9
SUM OF ALL RESPONSES TO PHQ QUESTIONS 1-9: 0
SUM OF ALL RESPONSES TO PHQ9 QUESTIONS 1 & 2: 0
SUM OF ALL RESPONSES TO PHQ9 QUESTIONS 1 & 2: 0
SUM OF ALL RESPONSES TO PHQ QUESTIONS 1-9: 0
SUM OF ALL RESPONSES TO PHQ QUESTIONS 1-9: 0
1. LITTLE INTEREST OR PLEASURE IN DOING THINGS: NOT AT ALL
2. FEELING DOWN, DEPRESSED OR HOPELESS: NOT AT ALL
SUM OF ALL RESPONSES TO PHQ QUESTIONS 1-9: 0
2. FEELING DOWN, DEPRESSED OR HOPELESS: NOT AT ALL
1. LITTLE INTEREST OR PLEASURE IN DOING THINGS: NOT AT ALL

## 2025-01-28 ENCOUNTER — OFFICE VISIT (OUTPATIENT)
Dept: PRIMARY CARE CLINIC | Age: 65
End: 2025-01-28

## 2025-01-28 VITALS
HEART RATE: 74 BPM | OXYGEN SATURATION: 97 % | BODY MASS INDEX: 28.62 KG/M2 | DIASTOLIC BLOOD PRESSURE: 76 MMHG | WEIGHT: 223 LBS | SYSTOLIC BLOOD PRESSURE: 118 MMHG | HEIGHT: 74 IN

## 2025-01-28 DIAGNOSIS — Z12.11 COLON CANCER SCREENING: ICD-10-CM

## 2025-01-28 DIAGNOSIS — Z11.59 SCREENING FOR VIRAL DISEASE: Primary | ICD-10-CM

## 2025-01-28 DIAGNOSIS — A60.00 GENITAL HERPES SIMPLEX, UNSPECIFIED SITE: ICD-10-CM

## 2025-01-28 DIAGNOSIS — Z12.5 PROSTATE CANCER SCREENING: ICD-10-CM

## 2025-01-28 DIAGNOSIS — I10 PRIMARY HYPERTENSION: ICD-10-CM

## 2025-01-28 DIAGNOSIS — N52.9 ERECTILE DYSFUNCTION, UNSPECIFIED ERECTILE DYSFUNCTION TYPE: ICD-10-CM

## 2025-01-28 PROCEDURE — 3074F SYST BP LT 130 MM HG: CPT | Performed by: INTERNAL MEDICINE

## 2025-01-28 PROCEDURE — 3078F DIAST BP <80 MM HG: CPT | Performed by: INTERNAL MEDICINE

## 2025-01-28 PROCEDURE — 99214 OFFICE O/P EST MOD 30 MIN: CPT | Performed by: INTERNAL MEDICINE

## 2025-01-28 RX ORDER — LISINOPRIL AND HYDROCHLOROTHIAZIDE 20; 25 MG/1; MG/1
1 TABLET ORAL DAILY
Qty: 90 TABLET | Refills: 1 | Status: SHIPPED | OUTPATIENT
Start: 2025-01-28

## 2025-01-28 RX ORDER — VALACYCLOVIR HYDROCHLORIDE 500 MG/1
TABLET, FILM COATED ORAL
Qty: 90 TABLET | Refills: 3 | Status: SHIPPED | OUTPATIENT
Start: 2025-01-28

## 2025-01-28 RX ORDER — TADALAFIL 5 MG/1
TABLET ORAL
Qty: 30 TABLET | Refills: 5 | Status: SHIPPED | OUTPATIENT
Start: 2025-01-28

## 2025-01-28 SDOH — ECONOMIC STABILITY: FOOD INSECURITY: WITHIN THE PAST 12 MONTHS, YOU WORRIED THAT YOUR FOOD WOULD RUN OUT BEFORE YOU GOT MONEY TO BUY MORE.: NEVER TRUE

## 2025-01-28 SDOH — ECONOMIC STABILITY: FOOD INSECURITY: WITHIN THE PAST 12 MONTHS, THE FOOD YOU BOUGHT JUST DIDN'T LAST AND YOU DIDN'T HAVE MONEY TO GET MORE.: NEVER TRUE

## 2025-01-28 NOTE — PATIENT INSTRUCTIONS
Annual flu vax is advised every Fall    COVID vax is advised at pharmacy    TDAP booster is due    Consider Shingrix vaccination series of 2 shots over 2-6 months if desired for protection from shingles-check with INS first re: coverage before beginning series

## 2025-01-28 NOTE — PROGRESS NOTES
1/28/2025   Juan Escoto  1960    The patients PMH, surgical history, family history, medications, allergies were all reviewed and updated as appropriate today.     Current Outpatient Medications on File Prior to Visit   Medication Sig Dispense Refill    lisinopril-hydroCHLOROthiazide (PRINZIDE;ZESTORETIC) 20-25 MG per tablet Take 1 tablet by mouth daily 90 tablet 0    tadalafil (CIALIS) 5 MG tablet TAKE 1 TABLET BY MOUTH ONE TIME A DAY AS NEEDED FOR ERECTILE DYSFUNCTION 30 tablet 1    omeprazole (PRILOSEC) 20 MG delayed release capsule Take 1 capsule by mouth daily       No current facility-administered medications on file prior to visit.        Chief Complaint   Patient presents with    Medication Refill     Pt here requesting refill on all medication.        HPI:  needs refill on Prinzide 20-25 and uses Prilosec 20 OTC  Last labs done 2013 so repeat labs ordered now and in 6 months    Review of Systems    OBJECTIVE:  /76   Pulse 74   Ht 1.88 m (6' 2\")   Wt 101.2 kg (223 lb)   SpO2 97%   BMI 28.63 kg/m²    Wt Readings from Last 3 Encounters:   01/28/25 101.2 kg (223 lb)   10/16/23 101.2 kg (223 lb)   08/23/22 102.5 kg (226 lb)       Physical Exam  Vitals and nursing note reviewed.   Constitutional:       General: He is not in acute distress.     Appearance: He is well-developed.      Comments: /76   Pulse 74   Ht 1.88 m (6' 2\")   Wt 101.2 kg (223 lb)   SpO2 97%   BMI 28.63 kg/m²    HENT:      Head: Normocephalic and atraumatic.   Eyes:      General: No scleral icterus.        Right eye: No discharge.         Left eye: No discharge.      Conjunctiva/sclera: Conjunctivae normal.      Pupils: Pupils are equal, round, and reactive to light.   Neck:      Thyroid: No thyromegaly.      Vascular: No JVD.      Trachea: No tracheal deviation.   Cardiovascular:      Rate and Rhythm: Normal rate and regular rhythm.      Heart sounds: Normal heart sounds. No murmur heard.  Pulmonary:      Effort:

## 2025-07-14 DIAGNOSIS — I10 PRIMARY HYPERTENSION: ICD-10-CM

## 2025-07-14 RX ORDER — LISINOPRIL AND HYDROCHLOROTHIAZIDE 20; 25 MG/1; MG/1
1 TABLET ORAL DAILY
Qty: 90 TABLET | Refills: 1 | Status: SHIPPED | OUTPATIENT
Start: 2025-07-14

## 2025-07-14 NOTE — TELEPHONE ENCOUNTER
Refill request   lisinopril-hydroCHLOROthiazide (PRINZIDE;ZESTORETIC) 20-25 MG per tablet   [facility] [address] [place], [place] - [address] - P [phone]